# Patient Record
Sex: FEMALE | Race: WHITE | URBAN - METROPOLITAN AREA
[De-identification: names, ages, dates, MRNs, and addresses within clinical notes are randomized per-mention and may not be internally consistent; named-entity substitution may affect disease eponyms.]

---

## 2018-06-29 ENCOUNTER — OFFICE VISIT (OUTPATIENT)
Dept: URGENT CARE | Facility: URGENT CARE | Age: 38
End: 2018-06-29
Payer: COMMERCIAL

## 2018-06-29 VITALS
SYSTOLIC BLOOD PRESSURE: 128 MMHG | HEART RATE: 85 BPM | WEIGHT: 212.4 LBS | OXYGEN SATURATION: 99 % | TEMPERATURE: 97.9 F | DIASTOLIC BLOOD PRESSURE: 80 MMHG

## 2018-06-29 DIAGNOSIS — J02.0 STREP THROAT: Primary | ICD-10-CM

## 2018-06-29 LAB
DEPRECATED S PYO AG THROAT QL EIA: ABNORMAL
SPECIMEN SOURCE: ABNORMAL

## 2018-06-29 PROCEDURE — 87880 STREP A ASSAY W/OPTIC: CPT | Performed by: PHYSICIAN ASSISTANT

## 2018-06-29 PROCEDURE — 99203 OFFICE O/P NEW LOW 30 MIN: CPT | Performed by: PHYSICIAN ASSISTANT

## 2018-06-29 RX ORDER — AZITHROMYCIN 250 MG/1
TABLET, FILM COATED ORAL
Qty: 6 TABLET | Refills: 0 | Status: SHIPPED | OUTPATIENT
Start: 2018-06-29

## 2018-06-29 ASSESSMENT — ENCOUNTER SYMPTOMS
FEVER: 0
PALPITATIONS: 0
COUGH: 0
DIAPHORESIS: 0
NAUSEA: 1
CONSTITUTIONAL NEGATIVE: 1
HEMOPTYSIS: 0
RESPIRATORY NEGATIVE: 1
SORE THROAT: 1
CARDIOVASCULAR NEGATIVE: 1
WEIGHT LOSS: 0
EYE PAIN: 0

## 2018-06-29 NOTE — MR AVS SNAPSHOT
After Visit Summary   6/29/2018    Carmen Arauz    MRN: [id number]           Patient Information     Date Of Birth          1980        Visit Information        Provider Department      6/29/2018 8:10 PM Ailyn Cote PA-C [facility]        Today's Diagnoses     Strep throat    -  1    Throat pain           Follow-ups after your visit        Who to contact     If you have questions or need follow up information about today's clinic visit or your schedule please contact [facility] directly at [phone].  Normal or non-critical lab and imaging results will be communicated to you by MyChart, letter or phone within 4 business days after the clinic has received the results. If you do not hear from us within 7 days, please contact the clinic through MyChart or phone. If you have a critical or abnormal lab result, we will notify you by phone as soon as possible.  Submit refill requests through [employer] or call your pharmacy and they will forward the refill request to us. Please allow 3 business days for your refill to be completed.          Additional Information About Your Visit        Care EveryWhere ID     This is your Care EveryWhere ID. This could be used by other organizations to access your [place] medical records  [id number]        Your Vitals Were     Pulse Temperature Pulse Oximetry             85 97.9  F (36.6  C) (Tympanic) 99%          Blood Pressure from Last 3 Encounters:   06/29/18 128/80    Weight from Last 3 Encounters:   06/29/18 212 lb 6.4 oz (96.3 kg)              We Performed the Following     Strep, Rapid Screen          Today's Medication Changes          These changes are accurate as of 6/29/18  8:37 PM.  If you have any questions, ask your nurse or doctor.               Start taking these medicines.        Dose/Directions    azithromycin 250 MG tablet   Commonly known as:  ZITHROMAX   Used for:  Strep throat        2 tablets the first day,  then 1 tablet daily for the next 4 days   Quantity:  6 tablet   Refills:  0            Where to get your medicines      These medications were sent to [facility] [id number] [place] [facility] - [place], [place] - [address]  [address] [address]     Phone:  [phone]     azithromycin 250 MG tablet                Primary Care Provider Office Phone # Fax #    [facility] [phone] [address] [address] [facility] [address]        Equal Access to Services     DANNI CAZARES : Hadii aad ku hadasho Soomaali, waaxda luqadaha, qaybta kaalmada adeegyada, waxay idiin hayaan shahriar eckert . So [facility] [phone].    ATENCIÓN: Si habla español, tiene a franco disposición servicios gratuitos de asistencia lingüística. Llame al [phone].    We comply with applicable federal civil rights laws and [place] laws. We do not discriminate on the basis of race, color, national origin, age, disability, sex, sexual orientation, or gender identity.            Thank you!     Thank you for choosing [facility]  for your care. Our goal is always to provide you with excellent care. Hearing back from our patients is one way we can continue to improve our services. Please take a few minutes to complete the written survey that you may receive in the mail after your visit with us. Thank you!             Your Updated Medication List - Protect others around you: Learn how to safely use, store and throw away your medicines at www.disposemymeds.org.          This list is accurate as of 6/29/18  8:37 PM.  Always use your most recent med list.                   Brand Name Dispense Instructions for use Diagnosis    azithromycin 250 MG tablet    ZITHROMAX    6 tablet    2 tablets the first day, then 1 tablet daily for the next 4 days    Strep throat       levonorgestrel 20 MCG/24HR IUD    MIRENA     1 Device by Intrauterine route

## 2018-06-30 NOTE — PROGRESS NOTES
SUBJECTIVE:     HPI  Camren Arauz is a 38 year old female who presents to clinic today for the following health issues:  RESPIRATORY SYMPTOMS    Duration: yesterday    Description  sore throat, nausea and upset stomach    Severity: mild    Accompanying signs and symptoms: No cough, shortness of breath or wheezing.  No sinus congestion/pain/pressure.  No n/v, constipation, diarrhea, bloody or black tarry stools.  No fever, chills or sweats.    History (predisposing factors):  strep exposure    Precipitating or alleviating factors: None    Therapies tried and outcome:  rest and fluids with minimal relief      Reviewed [facility].    Current Outpatient Prescriptions   Medication Sig Dispense Refill     levonorgestrel (MIRENA) 20 MCG/24HR IUD 1 Device by Intrauterine route       Allergies   Allergen Reactions     Amoxicillin Swelling and Rash     Penicillins Rash       Review of Systems   Constitutional: Negative.  Negative for diaphoresis, fever and weight loss.   HENT: Positive for sore throat.    Eyes: Negative for pain.   Respiratory: Negative.  Negative for cough and hemoptysis.    Cardiovascular: Negative.  Negative for chest pain and palpitations.   Gastrointestinal: Positive for nausea.   Skin: Negative.    All other systems reviewed and are negative.      /80  Pulse 85  Temp 97.9  F (36.6  C) (Tympanic)  Wt 212 lb 6.4 oz (96.3 kg)  SpO2 99%  Physical Exam   Constitutional: She is oriented to person, place, and time and well-developed, well-nourished, and in no distress. No distress.   HENT:   Head: Normocephalic and atraumatic.   Nose: Nose normal.   Mouth/Throat: Uvula is midline and mucous membranes are normal. Posterior oropharyngeal erythema present. No oropharyngeal exudate, posterior oropharyngeal edema or tonsillar abscesses.   TMs are intact without any erythema or bulging bilaterally.  Airway is patent.   Eyes: Conjunctivae and EOM are normal. Pupils are equal, round, and reactive to light.  No scleral icterus.   Neck: Normal range of motion. Neck supple. No thyromegaly present.   Cardiovascular: Normal rate, regular rhythm, normal heart sounds and intact distal pulses.  Exam reveals no gallop and no friction rub.    No murmur heard.  Pulmonary/Chest: Effort normal and breath sounds normal. No respiratory distress. She has no wheezes. She has no rales.   Lymphadenopathy:        Head (right side): Tonsillar adenopathy present.        Head (left side): Tonsillar adenopathy present.     She has no cervical adenopathy.   Neurological: She is alert and oriented to person, place, and time.   Skin: Skin is warm and dry. No rash noted.   Psychiatric: Mood, memory, affect and judgment normal.   Nursing note and vitals reviewed.        Assessment/Plan:  Strep throat:  RST is positive and will treat with zithromax X5days (PCN/amoxicillin allergy).  Tylenol/ibuprofen as needed for pain/fever.  S/he is considered contagious until they have been on abxs for at least 24hours.  No sharing food, cups or utensils.  Cover coughs and wash hands.  Change toothbrush.  Have family members and close contacts be tested if symptomatic.  Rest, fluids and chicken soup.  Recheck in clinic if symptoms worsen or if symptoms do not improve.  -     Strep, Rapid Screen  -     azithromycin (ZITHROMAX) 250 MG tablet; 2 tablets the first day, then 1 tablet daily for the next 4 days          Ailyn Cote PA-C